# Patient Record
Sex: MALE | Race: WHITE | Employment: FULL TIME | URBAN - METROPOLITAN AREA
[De-identification: names, ages, dates, MRNs, and addresses within clinical notes are randomized per-mention and may not be internally consistent; named-entity substitution may affect disease eponyms.]

---

## 2021-09-28 ENCOUNTER — NURSE TRIAGE (OUTPATIENT)
Dept: NURSING | Facility: CLINIC | Age: 36
End: 2021-09-28

## 2021-09-29 NOTE — TELEPHONE ENCOUNTER
"Patient states he was shaving and accidentally cut his scrotum. States it is bleeding. Reports he has attempted to stop the bleeding with putting pressure and also put a \"chip\" on there to stop the bleeding. Reports the cut occurred 3 hours ago. Denies having pain or swelling on his scrotum. Per guideline, advised patient to be seen at the emergency department.     Patient unsure if he will go to the emergency department due to not having Health Insurance. Patient asking if he can leave the \"chip\" on his scrotum to help with pain or put superglue to stop the bleeding. RN informed patient to not put superglue and the chip on his scrotum. Caller verbalized understanding. Denies further questions.        Syd Ferro RN  [facility]     COVID 19 Nurse Triage Plan/Patient Instructions    Please be aware that novel coronavirus (COVID-19) may be circulating in the community. If you develop symptoms such as fever, cough, or SOB or if you have concerns about the presence of another infection including coronavirus (COVID-19), please contact your health care provider or visit [email or web address].     Disposition/Instructions    ED Visit recommended. Follow protocol based instructions.     Bring Your Own Device:  Please also bring your smart device(s) (smart phones, tablets, laptops) and their charging cables for your personal use and to communicate with your care team during your visit.    Thank you for taking steps to prevent the spread of this virus.  o Limit your contact with others.  o Wear a simple mask to cover your cough.  o Wash your hands well and often.    Resources    [facility]: About COVID-19: [email or web address]    CDC: What to Do If You're Sick: www.cdc.gov/coronavirus/2019-ncov/about/steps-when-sick.html    CDC: Ending Home Isolation: www.cdc.gov/coronavirus/2019-ncov/hcp/disposition-in-home-patients.html     CDC: Caring for Someone: " "www.cdc.gov/coronavirus/2019-ncov/if-you-are-sick/care-for-someone.html     [facility]: Interim Guidance for Hospital Discharge to Home: www.health.[facility].[place]/diseases/coronavirus/hcp/hospdischarge.pdf    [facility] clinical trials (COVID-19 research studies): [email or web address]     Below are the COVID-19 hotlines at the [place] Department of [facility] ([facility]). Interpreters are available.   o For health questions: Call 086-620-6490 or 1-822.181.5539 (7 a.m. to 7 p.m.)  o For questions about schools and childcare: Call 916-330-5085 or 1-290.491.1392 (7 a.m. to 7 p.m.)                       Reason for Disposition    [1] Bleeding AND [2] won't stop after 10 minutes of direct pressure (using correct technique)    Additional Information    Negative: [1] Major bleeding (e.g., actively dripping or spurting) AND [2] can't be stopped    Negative: Amputation of penis    Negative: Sounds like a life-threatening emergency to the triager    Negative: Shock suspected (e.g., cold/pale/clammy skin, too weak to stand, low BP, rapid pulse)    Negative: [1] MODERATE-SEVERE pain in penis, scrotum, or testicle AND [2] lasts > 1 hour    Negative: Swollen scrotum or visible bruising of scrotum    Negative: Swollen penis or visible bruising of penis    Negative: [1] Had an erection AND [2] felt a \"crack\" or a \"pop\"    Negative: Skin of penis or scrotum caught in zipper    Negative: Pain or burning with passing urine (urination)    Negative: Can't urinate or difficulty passing urine    Negative: Blood in the urine or blood from tip of penis    Protocols used: GENITAL INJURY - MALE-A-AH      "